# Patient Record
Sex: FEMALE | Race: WHITE | Employment: OTHER | URBAN - METROPOLITAN AREA
[De-identification: names, ages, dates, MRNs, and addresses within clinical notes are randomized per-mention and may not be internally consistent; named-entity substitution may affect disease eponyms.]

---

## 2017-01-03 ENCOUNTER — OFFICE VISIT (OUTPATIENT)
Dept: FAMILY MEDICINE CLINIC | Facility: CLINIC | Age: 82
End: 2017-01-03

## 2017-01-03 ENCOUNTER — HOSPITAL ENCOUNTER (OUTPATIENT)
Dept: GENERAL RADIOLOGY | Facility: HOSPITAL | Age: 82
Discharge: HOME | End: 2017-01-03
Admitting: FAMILY MEDICINE

## 2017-01-03 VITALS
SYSTOLIC BLOOD PRESSURE: 130 MMHG | TEMPERATURE: 98.2 F | DIASTOLIC BLOOD PRESSURE: 60 MMHG | HEART RATE: 84 BPM | RESPIRATION RATE: 18 BRPM

## 2017-01-03 DIAGNOSIS — M54.50 ACUTE MIDLINE LOW BACK PAIN WITHOUT SCIATICA: Primary | ICD-10-CM

## 2017-01-03 DIAGNOSIS — M54.50 ACUTE MIDLINE LOW BACK PAIN WITHOUT SCIATICA: ICD-10-CM

## 2017-01-03 PROCEDURE — 99213 OFFICE O/P EST LOW 20 MIN: CPT | Performed by: FAMILY MEDICINE

## 2017-01-03 PROCEDURE — 72110 X-RAY EXAM L-2 SPINE 4/>VWS: CPT

## 2017-01-03 RX ORDER — TIZANIDINE 4 MG/1
TABLET ORAL
Qty: 30 TABLET | Refills: 0 | Status: SHIPPED | OUTPATIENT
Start: 2017-01-03 | End: 2017-02-08

## 2017-01-03 RX ORDER — HYDROCODONE BITARTRATE AND ACETAMINOPHEN 5; 325 MG/1; MG/1
1 TABLET ORAL EVERY 6 HOURS PRN
Qty: 20 TABLET | Refills: 0 | Status: SHIPPED | OUTPATIENT
Start: 2017-01-03 | End: 2017-02-08

## 2017-01-03 NOTE — PROGRESS NOTES
Subjective   Anabel Fay is a 85 y.o. female.     History of Present Illness     Pt complains of low back pain, both sides  In the morning her leg hurts and can be numb, mostly the left leg  This is the same pain as when she had Deb  She denies any fall nor trauma prior to her back pain starting  This week she did fall but she does not think, nor does her daughter think, that the pain was worse  Daughter notes a pop at times  Pain medication takes about 30 minutes.  The tramadol not helping more and she does need something stronger at night  She has been doing the back PT at home but just not helping very much    Pt unable to leave the home, she does not drive      Review of Systems   Constitutional: Negative.    Musculoskeletal: Positive for back pain.       Objective   Physical Exam   Constitutional: She appears well-developed and well-nourished. No distress.   Cardiovascular: Normal rate, regular rhythm and normal heart sounds.    Pulmonary/Chest: Effort normal and breath sounds normal.   Musculoskeletal:   Gait with walker, shuffling but steady.  No spinal TTP, no SI TTP   Psychiatric: She has a normal mood and affect. Her behavior is normal.   Nursing note and vitals reviewed.      Assessment/Plan   Anabel was seen today for follow-up.    Diagnoses and all orders for this visit:    Acute midline low back pain without sciatica  -     XR Spine Lumbar 4+ View; Future  -     HYDROcodone-acetaminophen (NORCO) 5-325 MG per tablet; Take 1 tablet by mouth Every 6 (Six) Hours As Needed for moderate pain (4-6) or severe pain (7-10).  -     tiZANidine (ZANAFLEX) 4 MG tablet; 1/2-1 po qhs PRN    check XR, muscel relaxer at night, pain medicine for sleep.  F/u pending XR and consider home health if XR normal.  Pt does not drive and cannot leave the house on her own.

## 2017-01-05 ENCOUNTER — TELEPHONE (OUTPATIENT)
Dept: FAMILY MEDICINE CLINIC | Facility: CLINIC | Age: 82
End: 2017-01-05

## 2017-01-05 DIAGNOSIS — S32.030G COMPRESSION FRACTURE OF L3 LUMBAR VERTEBRA, WITH DELAYED HEALING, SUBSEQUENT ENCOUNTER: Primary | ICD-10-CM

## 2017-01-05 DIAGNOSIS — S32.040G COMPRESSION FRACTURE OF L4 LUMBAR VERTEBRA, WITH DELAYED HEALING, SUBSEQUENT ENCOUNTER: ICD-10-CM

## 2017-01-05 NOTE — TELEPHONE ENCOUNTER
Please call.  X-ray shows compression fracture of the third and fourth lumbar vertebra.  These might be new but difficult to say on the x-ray.  Did she recently fall?  Is she having a lot of pain right now?.  Recommend referral to orthopedics for evaluation.  Diagnosis compression fracture lumbar spine, L3-L4. bds

## 2017-01-05 NOTE — TELEPHONE ENCOUNTER
Daughter Destinee calling regarding her mom's xray. Dr. Cooney is out today, it is in the chart, will see if Dr. Bains can look at it.

## 2017-01-05 NOTE — TELEPHONE ENCOUNTER
Called pt's daughter Destinee with the results and she has been hurting since Deb, she fell several days ago, but her mother doesn't always tell her when things happen. Right now, she is still hurting but not hardly as bad.  I informed her you will place the order for the referral and sign it and Tori will call her with an appt.  They prefer mornings if possible.

## 2017-01-06 RX ORDER — CALCITONIN SALMON 200 [IU]/.09ML
SPRAY, METERED NASAL
Qty: 3.7 ML | Refills: 5 | Status: SHIPPED | OUTPATIENT
Start: 2017-01-06 | End: 2017-07-05 | Stop reason: SDUPTHER

## 2017-01-06 NOTE — TELEPHONE ENCOUNTER
Lets start miacalcin (script sent in), could help with pain, agree with ortho eval and would like to get DEXA bone scan but computer will not let me order.

## 2017-01-23 RX ORDER — RALOXIFENE HYDROCHLORIDE 60 MG/1
TABLET, FILM COATED ORAL
Qty: 30 TABLET | Refills: 2 | Status: SHIPPED | OUTPATIENT
Start: 2017-01-23 | End: 2017-04-20 | Stop reason: SDUPTHER

## 2017-02-08 ENCOUNTER — OFFICE VISIT (OUTPATIENT)
Dept: CARDIOLOGY | Facility: CLINIC | Age: 82
End: 2017-02-08

## 2017-02-08 VITALS
WEIGHT: 112 LBS | HEART RATE: 80 BPM | DIASTOLIC BLOOD PRESSURE: 80 MMHG | HEIGHT: 60 IN | BODY MASS INDEX: 21.99 KG/M2 | SYSTOLIC BLOOD PRESSURE: 140 MMHG

## 2017-02-08 DIAGNOSIS — I31.39 PERICARDIAL EFFUSION: Primary | ICD-10-CM

## 2017-02-08 PROCEDURE — 99202 OFFICE O/P NEW SF 15 MIN: CPT | Performed by: INTERNAL MEDICINE

## 2017-02-08 NOTE — PROGRESS NOTES
Subjective:     Encounter Date:02/08/2017      Patient ID: Anabel Fay is a 85 y.o. female.    Chief Complaint:Pericardial effusion        Past Medical History:  1. Pericardial Effusion  A. 12/2016 CT scan with moderate to large pericardial effusion  B. 1/2017 ECHO ([facility]) EF 65-70% moderat LVH, small pericardial effusion. Fibrinous material noted in the pericardium.  2. Dyslipidemia  3. Mild dementia  4. Hypothyroidism  5. Cervical cancer  A. Hysterectomy      Past Surgical History:  1. Hysterectomy  2. Surgery for vaginal prolapse  3. Kyphoplasty 1/2016        Past Medical History   Diagnosis Date   • Hyperlipidemia    • Hypothyroidism          Allergies   Allergen Reactions   • Sulfa Antibiotics          Current Outpatient Prescriptions:   •  atorvastatin (LIPITOR) 20 MG tablet, TAKE ONE TABLET BY MOUTH DAILY, Disp: 30 tablet, Rfl: 4  •  calcitonin, salmon, (MIACALCIN) 200 UNIT/ACT nasal spray, 1 spray in one nostril daily, alternate nostril, Disp: 3.7 mL, Rfl: 5  •  ibuprofen (ADVIL,MOTRIN) 100 MG/5ML suspension, Take 200 mg by mouth Daily., Disp: , Rfl:   •  levothyroxine (SYNTHROID, LEVOTHROID) 75 MCG tablet, TAKE ONE TABLET BY MOUTH DAILY, Disp: 30 tablet, Rfl: 2  •  NAMENDA XR 28 MG capsule sustained-release 24 hr extended release capsule, , Disp: , Rfl:   •  raloxifene (EVISTA) 60 MG tablet, TAKE ONE TABLET BY MOUTH DAILY AS DIRECTED, Disp: 30 tablet, Rfl: 2        History of Present Illness    Patient is a very pleasant 85-year-old  female who were seeing today for further evaluation of pericardial effusion.  She is accompanied by her daughter who gives most history secondary to her dementia.  In December of this past year they've presented to the emergency department for further evaluation of lower back pain.  CT scan was performed at that time which revealed pericardial effusion.  She was eventually noted to have a spinal issue for which she has now undergone kyphoplasty.  She subsequently  "followed up with her primary care physician who referred her for an echocardiogram.  Echocardiogram only showed small pericardial effusion.  Overall patient is asymptomatic.  She denies any chest pain, pressure, tightness.  Denies any increasing shortness of breath.  Denies any syncope, near-syncope, edema.  Patient's daughter notes that they're mostly here for further evaluation of her pericardial effusion.    The following portions of the patient's history were reviewed and updated as appropriate: allergies, current medications, past family history, past medical history, past social history, past surgical history and problem list.    Family History   Problem Relation Age of Onset   • Arthritis Other    • Cancer Other    • Other Other      high blood pressure   • Osteoporosis Other        Social History   Substance Use Topics   • Smoking status: Former Smoker   • Smokeless tobacco: None      Comment: stopped smoking many years ago   • Alcohol use No         Review of Systems   Constitution: Negative for fever, weakness and malaise/fatigue.   HENT: Negative for headaches and nosebleeds.    Eyes: Negative for redness and visual disturbance.   Cardiovascular: Negative for orthopnea, palpitations and paroxysmal nocturnal dyspnea.   Respiratory: Negative for cough, snoring, sputum production and wheezing.    Hematologic/Lymphatic: Negative for bleeding problem.   Skin: Negative for flushing, itching and rash.   Musculoskeletal: Positive for arthritis. Negative for falls, joint pain and muscle cramps.   Gastrointestinal: Negative for abdominal pain, diarrhea, heartburn, nausea and vomiting.   Genitourinary: Negative for hematuria.   Neurological: Negative for excessive daytime sleepiness, dizziness and tremors.   Psychiatric/Behavioral: Positive for memory loss. Negative for substance abuse. The patient is not nervous/anxious.           Objective:    height is 60\" (152.4 cm) and weight is 112 lb (50.8 kg). Her blood " pressure is 140/80 and her pulse is 80.         Physical Exam   Constitutional: She appears well-developed and well-nourished.   HENT:   Head: Normocephalic and atraumatic.   Mouth/Throat: Oropharynx is clear and moist.   Eyes: Conjunctivae are normal. Pupils are equal, round, and reactive to light.   Neck: Normal carotid pulses and no JVD present. Carotid bruit is not present. No thyromegaly present.   Cardiovascular: Normal rate, regular rhythm, S1 normal and S2 normal.  Exam reveals no gallop and no friction rub.    No murmur heard.  Pulses:       Carotid pulses are 2+ on the right side, and 2+ on the left side.       Dorsalis pedis pulses are 2+ on the right side, and 2+ on the left side.        Posterior tibial pulses are 2+ on the right side, and 2+ on the left side.   Pulmonary/Chest: No respiratory distress. She has no wheezes. She has no rales. She exhibits no tenderness.   Abdominal: She exhibits no distension, no abdominal bruit and no mass. There is no hepatosplenomegaly. There is no tenderness. There is no rebound.   Musculoskeletal: She exhibits no edema, tenderness or deformity.   Lymphadenopathy:     She has no cervical adenopathy.   Neurological: She is alert. She has normal strength.   Skin: Skin is warm and dry. No rash noted. No cyanosis. Nails show no clubbing.   Psychiatric: She has a normal mood and affect. Cognition and memory are normal.       Procedures          Assessment:   Assessment/Plan      Anabel was seen today for establish care and hyperlipidemia.    Diagnoses and all orders for this visit:    Pericardial effusion      Discussed options with patient and her caregiver daughter today.  She has a chronic A. fib and is appearing mild to moderate size pericardial effusion without any hemodynamic consequences.  She is asymptomatic.  The etiology is likely post viral/inflammatory.  She had a CT of chest and back recently which showed no evidence of malignancy.  Given her dementia and age, I  do not think any further workup is necessary.  Treatment is not necessary as she is asymptomatic.  This should resolve on its own.  The caregiver was warned that if she develops lower extremity edema presyncope or shortness of breath that she may recontact us for a repeat echocardiogram.  Otherwise we'll treat conservatively.     I, Pravin Whipple MD, personally performed the services described in this documentation as scribed by the above individual in my presence, and it is both accurate and complete    Please note that portions of this note may have been completed with a voice recognition program. Efforts were made to edit the dictations, but occasionally words are mistranscribed.

## 2017-02-09 RX ORDER — ATORVASTATIN CALCIUM 20 MG/1
TABLET, FILM COATED ORAL
Qty: 30 TABLET | Refills: 3 | Status: SHIPPED | OUTPATIENT
Start: 2017-02-09 | End: 2017-06-07 | Stop reason: SDUPTHER

## 2017-03-17 RX ORDER — LEVOTHYROXINE SODIUM 0.07 MG/1
TABLET ORAL
Qty: 30 TABLET | Refills: 1 | Status: SHIPPED | OUTPATIENT
Start: 2017-03-17 | End: 2017-05-10 | Stop reason: SDUPTHER

## 2017-04-20 RX ORDER — RALOXIFENE HYDROCHLORIDE 60 MG/1
TABLET, FILM COATED ORAL
Qty: 30 TABLET | Refills: 1 | Status: SHIPPED | OUTPATIENT
Start: 2017-04-20 | End: 2017-06-17 | Stop reason: SDUPTHER

## 2017-05-05 ENCOUNTER — OFFICE VISIT (OUTPATIENT)
Dept: FAMILY MEDICINE CLINIC | Facility: CLINIC | Age: 82
End: 2017-05-05

## 2017-05-05 VITALS
DIASTOLIC BLOOD PRESSURE: 90 MMHG | OXYGEN SATURATION: 98 % | TEMPERATURE: 97.6 F | HEART RATE: 71 BPM | BODY MASS INDEX: 22.78 KG/M2 | RESPIRATION RATE: 14 BRPM | WEIGHT: 116 LBS | SYSTOLIC BLOOD PRESSURE: 136 MMHG | HEIGHT: 60 IN

## 2017-05-05 DIAGNOSIS — R35.0 URINE FREQUENCY: ICD-10-CM

## 2017-05-05 DIAGNOSIS — F02.80 ALZHEIMER'S DISEASE OF OTHER ONSET WITHOUT BEHAVIORAL DISTURBANCE: ICD-10-CM

## 2017-05-05 DIAGNOSIS — E78.00 PURE HYPERCHOLESTEROLEMIA: Primary | ICD-10-CM

## 2017-05-05 DIAGNOSIS — L98.9 SKIN LESION OF LEFT LEG: ICD-10-CM

## 2017-05-05 DIAGNOSIS — E03.9 ACQUIRED HYPOTHYROIDISM: ICD-10-CM

## 2017-05-05 DIAGNOSIS — G30.8 ALZHEIMER'S DISEASE OF OTHER ONSET WITHOUT BEHAVIORAL DISTURBANCE: ICD-10-CM

## 2017-05-05 LAB
ALBUMIN SERPL-MCNC: 3.9 G/DL (ref 3.2–4.8)
ALBUMIN/GLOB SERPL: 1.5 G/DL (ref 1.5–2.5)
ALP SERPL-CCNC: 78 U/L (ref 25–100)
ALT SERPL-CCNC: 16 U/L (ref 7–40)
AST SERPL-CCNC: 26 U/L (ref 0–33)
BILIRUB BLD-MCNC: NEGATIVE MG/DL
BILIRUB SERPL-MCNC: 0.6 MG/DL (ref 0.3–1.2)
BUN SERPL-MCNC: 19 MG/DL (ref 9–23)
BUN/CREAT SERPL: 23.8 (ref 7–25)
CALCIUM SERPL-MCNC: 9.4 MG/DL (ref 8.7–10.4)
CHLORIDE SERPL-SCNC: 107 MMOL/L (ref 99–109)
CHOLEST SERPL-MCNC: 185 MG/DL (ref 0–200)
CHOLEST/HDLC SERPL: 2.5 {RATIO}
CLARITY, POC: CLEAR
CO2 SERPL-SCNC: 29 MMOL/L (ref 20–31)
COLOR UR: YELLOW
CREAT SERPL-MCNC: 0.8 MG/DL (ref 0.6–1.3)
GLOBULIN SER CALC-MCNC: 2.6 GM/DL
GLUCOSE SERPL-MCNC: 88 MG/DL (ref 70–100)
GLUCOSE UR STRIP-MCNC: NEGATIVE MG/DL
HDLC SERPL-MCNC: 74 MG/DL (ref 40–60)
KETONES UR QL: NEGATIVE
LDLC SERPL CALC-MCNC: 94 MG/DL (ref 0–100)
LEUKOCYTE EST, POC: NEGATIVE
NITRITE UR-MCNC: NEGATIVE MG/ML
PH UR: 6 [PH] (ref 5–8)
POTASSIUM SERPL-SCNC: 4.3 MMOL/L (ref 3.5–5.5)
PROT SERPL-MCNC: 6.5 G/DL (ref 5.7–8.2)
PROT UR STRIP-MCNC: NEGATIVE MG/DL
RBC # UR STRIP: ABNORMAL /UL
SODIUM SERPL-SCNC: 140 MMOL/L (ref 132–146)
SP GR UR: 1.02 (ref 1–1.03)
TRIGL SERPL-MCNC: 87 MG/DL (ref 0–150)
TSH SERPL DL<=0.005 MIU/L-ACNC: 1.81 MIU/ML (ref 0.35–5.35)
UROBILINOGEN UR QL: NORMAL
VLDLC SERPL CALC-MCNC: 17.4 MG/DL

## 2017-05-05 PROCEDURE — 99214 OFFICE O/P EST MOD 30 MIN: CPT | Performed by: FAMILY MEDICINE

## 2017-05-05 PROCEDURE — 81003 URINALYSIS AUTO W/O SCOPE: CPT | Performed by: FAMILY MEDICINE

## 2017-05-08 ENCOUNTER — TELEPHONE (OUTPATIENT)
Dept: FAMILY MEDICINE CLINIC | Facility: CLINIC | Age: 82
End: 2017-05-08

## 2017-05-08 DIAGNOSIS — N30.01 ACUTE CYSTITIS WITH HEMATURIA: Primary | ICD-10-CM

## 2017-05-08 RX ORDER — NITROFURANTOIN 25; 75 MG/1; MG/1
100 CAPSULE ORAL 2 TIMES DAILY
Qty: 14 CAPSULE | Refills: 0 | Status: SHIPPED | OUTPATIENT
Start: 2017-05-08 | End: 2017-11-16

## 2017-05-10 RX ORDER — LEVOTHYROXINE SODIUM 0.07 MG/1
TABLET ORAL
Qty: 30 TABLET | Refills: 1 | Status: SHIPPED | OUTPATIENT
Start: 2017-05-10 | End: 2017-07-07 | Stop reason: SDUPTHER

## 2017-06-07 RX ORDER — ATORVASTATIN CALCIUM 20 MG/1
TABLET, FILM COATED ORAL
Qty: 30 TABLET | Refills: 2 | Status: SHIPPED | OUTPATIENT
Start: 2017-06-07 | End: 2017-09-07 | Stop reason: SDUPTHER

## 2017-06-19 RX ORDER — RALOXIFENE HYDROCHLORIDE 60 MG/1
TABLET, FILM COATED ORAL
Qty: 30 TABLET | Refills: 0 | Status: SHIPPED | OUTPATIENT
Start: 2017-06-19 | End: 2017-07-18 | Stop reason: SDUPTHER

## 2017-07-05 DIAGNOSIS — S32.040G COMPRESSION FRACTURE OF L4 LUMBAR VERTEBRA, WITH DELAYED HEALING, SUBSEQUENT ENCOUNTER: ICD-10-CM

## 2017-07-05 DIAGNOSIS — S32.030G COMPRESSION FRACTURE OF L3 LUMBAR VERTEBRA, WITH DELAYED HEALING, SUBSEQUENT ENCOUNTER: ICD-10-CM

## 2017-07-05 RX ORDER — CALCITONIN SALMON 200 [IU]/.09ML
SPRAY, METERED NASAL
Qty: 1 EACH | Refills: 4 | Status: SHIPPED | OUTPATIENT
Start: 2017-07-05 | End: 2017-11-30 | Stop reason: SDUPTHER

## 2017-07-07 RX ORDER — LEVOTHYROXINE SODIUM 0.07 MG/1
TABLET ORAL
Qty: 30 TABLET | Refills: 0 | Status: SHIPPED | OUTPATIENT
Start: 2017-07-07 | End: 2017-07-07 | Stop reason: SDUPTHER

## 2017-07-07 RX ORDER — LEVOTHYROXINE SODIUM 0.07 MG/1
75 TABLET ORAL DAILY
Qty: 30 TABLET | Refills: 2 | Status: SHIPPED | OUTPATIENT
Start: 2017-07-07 | End: 2017-11-05 | Stop reason: SDUPTHER

## 2017-07-18 RX ORDER — RALOXIFENE HYDROCHLORIDE 60 MG/1
TABLET, FILM COATED ORAL
Qty: 30 TABLET | Refills: 0 | Status: SHIPPED | OUTPATIENT
Start: 2017-07-18 | End: 2017-08-16 | Stop reason: SDUPTHER

## 2017-08-16 RX ORDER — RALOXIFENE HYDROCHLORIDE 60 MG/1
TABLET, FILM COATED ORAL
Qty: 30 TABLET | Refills: 0 | Status: SHIPPED | OUTPATIENT
Start: 2017-08-16 | End: 2017-09-13 | Stop reason: SDUPTHER

## 2017-09-07 RX ORDER — ATORVASTATIN CALCIUM 20 MG/1
TABLET, FILM COATED ORAL
Qty: 30 TABLET | Refills: 1 | Status: SHIPPED | OUTPATIENT
Start: 2017-09-07 | End: 2017-11-06 | Stop reason: SDUPTHER

## 2017-09-13 RX ORDER — RALOXIFENE HYDROCHLORIDE 60 MG/1
TABLET, FILM COATED ORAL
Qty: 30 TABLET | Refills: 0 | Status: SHIPPED | OUTPATIENT
Start: 2017-09-13 | End: 2017-10-11 | Stop reason: SDUPTHER

## 2017-10-11 RX ORDER — RALOXIFENE HYDROCHLORIDE 60 MG/1
TABLET, FILM COATED ORAL
Qty: 30 TABLET | Refills: 1 | Status: SHIPPED | OUTPATIENT
Start: 2017-10-11 | End: 2017-12-08 | Stop reason: SDUPTHER

## 2017-11-06 RX ORDER — LEVOTHYROXINE SODIUM 0.07 MG/1
TABLET ORAL
Qty: 30 TABLET | Refills: 0 | Status: SHIPPED | OUTPATIENT
Start: 2017-11-06 | End: 2017-11-27 | Stop reason: SDUPTHER

## 2017-11-06 RX ORDER — ATORVASTATIN CALCIUM 20 MG/1
TABLET, FILM COATED ORAL
Qty: 30 TABLET | Refills: 0 | Status: SHIPPED | OUTPATIENT
Start: 2017-11-06 | End: 2017-12-06 | Stop reason: SDUPTHER

## 2017-11-16 ENCOUNTER — OFFICE VISIT (OUTPATIENT)
Dept: FAMILY MEDICINE CLINIC | Facility: CLINIC | Age: 82
End: 2017-11-16

## 2017-11-16 VITALS
HEART RATE: 66 BPM | WEIGHT: 114 LBS | TEMPERATURE: 97.6 F | RESPIRATION RATE: 12 BRPM | HEIGHT: 60 IN | OXYGEN SATURATION: 95 % | DIASTOLIC BLOOD PRESSURE: 78 MMHG | BODY MASS INDEX: 22.38 KG/M2 | SYSTOLIC BLOOD PRESSURE: 156 MMHG

## 2017-11-16 DIAGNOSIS — E78.00 PURE HYPERCHOLESTEROLEMIA: Primary | ICD-10-CM

## 2017-11-16 DIAGNOSIS — G30.8 ALZHEIMER'S DISEASE OF OTHER ONSET WITHOUT BEHAVIORAL DISTURBANCE: ICD-10-CM

## 2017-11-16 DIAGNOSIS — F02.80 ALZHEIMER'S DISEASE OF OTHER ONSET WITHOUT BEHAVIORAL DISTURBANCE: ICD-10-CM

## 2017-11-16 DIAGNOSIS — Z23 IMMUNIZATION DUE: ICD-10-CM

## 2017-11-16 PROCEDURE — 90662 IIV NO PRSV INCREASED AG IM: CPT | Performed by: FAMILY MEDICINE

## 2017-11-16 PROCEDURE — 99213 OFFICE O/P EST LOW 20 MIN: CPT | Performed by: FAMILY MEDICINE

## 2017-11-16 PROCEDURE — G0008 ADMIN INFLUENZA VIRUS VAC: HCPCS | Performed by: FAMILY MEDICINE

## 2017-11-16 RX ORDER — MEMANTINE HYDROCHLORIDE AND DONEPEZIL HYDROCHLORIDE 28; 10 MG/1; MG/1
CAPSULE ORAL
Qty: 30 CAPSULE | COMMUNITY
Start: 2017-11-16 | End: 2018-05-24

## 2017-11-16 RX ORDER — MEMANTINE HYDROCHLORIDE AND DONEPEZIL HYDROCHLORIDE 28; 10 MG/1; MG/1
CAPSULE ORAL
COMMUNITY
Start: 2017-10-28 | End: 2017-11-16 | Stop reason: SDUPTHER

## 2017-11-16 NOTE — PROGRESS NOTES
Assessment/Plan     Problem List Items Addressed This Visit        Cardiovascular and Mediastinum    Hyperlipidemia - Primary       Nervous and Auditory    Dementia without behavioral disturbance    Relevant Medications    NAMZARIC 28-10 MG capsule sustained-release 24 hr      Other Visit Diagnoses     Immunization due        Relevant Orders    Flu Vaccine High Dose PF 65YR+ ([id number]) (Completed)           Follow up: Return in about 6 months (around 5/16/2018), or if symptoms worsen or fail to improve.     DISCUSSION  Overall stable and doing well. Continue current medications. Chronic problems noted are stable. Check labs in 6 months since last check was good and Return in about 6 months (around 5/16/2018), or if symptoms worsen or fail to improve.       Flu shot today as well.     MEDICATIONS PRESCRIBED  Requested Prescriptions      No prescriptions requested or ordered in this encounter            -------------------------------------------    Subjective     Chief Complaint   Patient presents with   • Hyperlipidemia   • Hypothyroidism   • Med Refill       Hyperlipidemia   This is a chronic problem. The current episode started more than 1 year ago. The problem is controlled. Exacerbating diseases include hypothyroidism. Pertinent negatives include no myalgias. The current treatment provides moderate improvement of lipids. There are no compliance problems.  Risk factors for coronary artery disease include dyslipidemia.   Hypothyroidism   This is a chronic problem. The current episode started more than 1 year ago. The problem occurs constantly. The problem has been unchanged (Doing well on medication.). Pertinent negatives include no myalgias, nausea or vomiting. Nothing aggravates the symptoms. Treatments tried: Levothyroxin. The treatment provided moderate relief.     Memory loss  Sees Dr Bains, neurology 11/27/2017.  Still with memory loss.   On Namzaric      Past Medical History,Medications, Allergies, and  "social history was reviewed.    Review of Systems   Constitutional: Negative.    HENT: Negative.    Respiratory: Negative.    Cardiovascular: Negative.    Gastrointestinal: Negative for nausea and vomiting.   Musculoskeletal: Negative for myalgias.   Neurological: Negative.    Psychiatric/Behavioral:        Memory loss, sl worse per dtr, sees Dr Bains       Objective     Vitals:    11/16/17 1052   BP: 156/78   Pulse: 66   Resp: 12   Temp: 97.6 °F (36.4 °C)   TempSrc: Temporal Artery    SpO2: 95%   Weight: 114 lb (51.7 kg)   Height: 60\" (152.4 cm)        Physical Exam   Constitutional: She is oriented to person, place, and time. She appears well-developed and well-nourished.   HENT:   Head: Normocephalic and atraumatic.   Right Ear: Hearing and external ear normal.   Left Ear: Hearing and external ear normal.   Eyes: Conjunctivae and EOM are normal. Pupils are equal, round, and reactive to light.   Cardiovascular: Normal rate and regular rhythm.  Exam reveals no friction rub.    Murmur (1-2 /6 sys, no change) heard.  Pulmonary/Chest: Effort normal and breath sounds normal. No respiratory distress. She has no wheezes. She has no rales.   Abdominal: Soft. She exhibits no distension. There is no tenderness.   Musculoskeletal: She exhibits no edema.   Neurological: She is alert and oriented to person, place, and time.   Skin: Skin is warm.   Psychiatric: She has a normal mood and affect. Her behavior is normal.   Nursing note and vitals reviewed.              Aldo Bains MD    "

## 2017-11-27 RX ORDER — LEVOTHYROXINE SODIUM 0.07 MG/1
TABLET ORAL
Qty: 30 TABLET | Refills: 5 | Status: SHIPPED | OUTPATIENT
Start: 2017-11-27 | End: 2018-05-25

## 2017-11-30 RX ORDER — CALCITONIN SALMON 200 [IU]/.09ML
SPRAY, METERED NASAL
Qty: 1 EACH | Refills: 3 | Status: SHIPPED | OUTPATIENT
Start: 2017-11-30 | End: 2018-05-24

## 2017-12-06 RX ORDER — ATORVASTATIN CALCIUM 20 MG/1
TABLET, FILM COATED ORAL
Qty: 30 TABLET | Refills: 5 | Status: SHIPPED | OUTPATIENT
Start: 2017-12-06 | End: 2018-05-24

## 2017-12-08 RX ORDER — RALOXIFENE HYDROCHLORIDE 60 MG/1
TABLET, FILM COATED ORAL
Qty: 30 TABLET | Refills: 1 | Status: SHIPPED | OUTPATIENT
Start: 2017-12-08 | End: 2018-02-07 | Stop reason: SDUPTHER

## 2018-01-26 ENCOUNTER — TELEPHONE (OUTPATIENT)
Dept: FAMILY MEDICINE CLINIC | Facility: CLINIC | Age: 83
End: 2018-01-26

## 2018-01-26 NOTE — TELEPHONE ENCOUNTER
Week half ago pt took a turn for worse. HH OR HOSPICE NEEDS TO CK HER. FROM WHAT DAUGHTER HAS SPOKEN WITH OTHERS WITH EXPERIENCE IN THIS SITUATION.  SHE ASK DAUGHTER TODAY WHO SHE WAS. SHE ISN'T ABLE TO GET IN SHOWER SHE IS AFRAID SHE WILL FALL AND CRIES AND SLEEPS ALL THE TIME. DAUGHTER IS ASKING IF WE CAN GET EVAL AND ASKING FOR A SHOWER CHAIR. PT DOESN'T DO CONVERSATION ANYMORE SHE EITHER ANSWES YES OR NO OR SHE REMEMBERS THAT. NATALIYA WANTED YOUR OPINION ON WHAT TO DO.

## 2018-01-26 NOTE — TELEPHONE ENCOUNTER
Please call, if made a drastic change, then rec office visit to check and make sure no infection or urinary tract infection. Can she get her in ? May need to see Ankita if can get in today.

## 2018-01-26 NOTE — TELEPHONE ENCOUNTER
SPOKE WITH DAUGHTER AND INFORMED HER OF THIS AND DAUGHTER STATES 10 DAYS AGO THEY TESTED URINE AND IT WAS OK. SO THEY STARTED HER ON ATIVAN 0.5 AND IT HELPS BUT SHE ISN'T EASY TO GET ALONG WITH RIGHT NOW. PT HAS KIND OF QUIT EATING AND SHE HAS RECOGNIZED DAUGHTER 2. DAUGHTER HAS NO ONE TO SIT WITH MICHELE TODAY.  DAUGHTER STATES THAT SHE WILL TRY TO GET HER HERE AS PT IS BEING VERY DIFFICULT LATELY BUT WILL DO HER BEST AND WAS TRANSFERRED UP FRONT TO SCHEDULE APPT.

## 2018-01-26 NOTE — TELEPHONE ENCOUNTER
----- Message from Kamilla Vo sent at 1/26/2018 10:24 AM EST -----  Contact: BROWNE  DAUGHTER, JESÚS MENCHACA, NEEDS TO SPEAK WITH YOU ABOUT PT. SHE HAS TAKEN A TURN FOR THE WORSE WITH HER DERM.     [phone]

## 2018-02-05 ENCOUNTER — TELEPHONE (OUTPATIENT)
Dept: FAMILY MEDICINE CLINIC | Facility: CLINIC | Age: 83
End: 2018-02-05

## 2018-02-05 NOTE — TELEPHONE ENCOUNTER
Please call, more info. How is she doing? She was supposed to come in for a check. Is she able to come in?

## 2018-02-05 NOTE — TELEPHONE ENCOUNTER
----- Message from Kamilla Meredith sent at 2/5/2018  1:36 PM EST -----  Contact: tai;BRITTANY [facility] DR BROWNE BE THE ATTENDING PHYSICIAN FOR PT AND CERTIFY PT  AS TERMINAL    [phone]

## 2018-02-05 NOTE — TELEPHONE ENCOUNTER
SPOKE WITH NATALIYA AND PT ISN'T ABLE TO COME IN AND HOSPICE IS COMING IN THE MORNING TO EVALUATE HER AND PT ISN'T DOING WELL. NATALIYA SAYS PT HAS REALLY GONE DOWN AND SHE IS HARDLY ABLE TO GET HER MEDS IN HER. PLEASE ADVISE ON IF YOU WANT ME CALL BRITTANY FROM HOSPICE TOMORROW.

## 2018-02-07 RX ORDER — RALOXIFENE HYDROCHLORIDE 60 MG/1
TABLET, FILM COATED ORAL
Qty: 30 TABLET | Refills: 0 | Status: SHIPPED | OUTPATIENT
Start: 2018-02-07 | End: 2018-05-24

## 2018-04-19 ENCOUNTER — TELEPHONE (OUTPATIENT)
Dept: FAMILY MEDICINE CLINIC | Facility: CLINIC | Age: 83
End: 2018-04-19

## 2018-04-19 NOTE — TELEPHONE ENCOUNTER
----- Message from Stacey Carroll sent at 4/19/2018 10:01 AM EDT -----  Contact: Herson Parnell with Hospice care is calling to let Dr. Bains know that patient is being discharged on May 7th. Please call at [phone].

## 2018-05-24 ENCOUNTER — OFFICE VISIT (OUTPATIENT)
Dept: FAMILY MEDICINE CLINIC | Facility: CLINIC | Age: 83
End: 2018-05-24

## 2018-05-24 VITALS
HEART RATE: 74 BPM | SYSTOLIC BLOOD PRESSURE: 152 MMHG | DIASTOLIC BLOOD PRESSURE: 84 MMHG | RESPIRATION RATE: 12 BRPM | OXYGEN SATURATION: 95 % | BODY MASS INDEX: 22.85 KG/M2 | WEIGHT: 117 LBS

## 2018-05-24 DIAGNOSIS — G30.1 LATE ONSET ALZHEIMER'S DISEASE WITHOUT BEHAVIORAL DISTURBANCE (HCC): Primary | ICD-10-CM

## 2018-05-24 DIAGNOSIS — E03.9 ACQUIRED HYPOTHYROIDISM: ICD-10-CM

## 2018-05-24 DIAGNOSIS — F02.80 LATE ONSET ALZHEIMER'S DISEASE WITHOUT BEHAVIORAL DISTURBANCE (HCC): Primary | ICD-10-CM

## 2018-05-24 DIAGNOSIS — F41.9 ANXIETY: ICD-10-CM

## 2018-05-24 PROCEDURE — 99214 OFFICE O/P EST MOD 30 MIN: CPT | Performed by: FAMILY MEDICINE

## 2018-05-24 RX ORDER — LORAZEPAM 0.5 MG/1
0.5 TABLET ORAL EVERY 8 HOURS PRN
Qty: 60 TABLET | Refills: 2 | Status: SHIPPED | OUTPATIENT
Start: 2018-05-24 | End: 2018-07-16 | Stop reason: SDUPTHER

## 2018-05-24 RX ORDER — LORAZEPAM 0.5 MG/1
0.5 TABLET ORAL EVERY 8 HOURS PRN
COMMUNITY
End: 2018-05-24 | Stop reason: SDUPTHER

## 2018-05-24 NOTE — PROGRESS NOTES
Assessment/Plan       Problems Addressed this Visit        Endocrine    Hypothyroidism    Relevant Orders    TSH       Nervous and Auditory    Dementia without behavioral disturbance - Primary    Relevant Medications    LORazepam (ATIVAN) 0.5 MG tablet    Other Relevant Orders    CBC & Differential    Comprehensive Metabolic Panel       Other    Anxiety    Relevant Medications    LORazepam (ATIVAN) 0.5 MG tablet            Follow up: Return for follow up depends on review of labs and testing.     DISCUSSION  Alzheimer's dementia.  Stable.  Continue lorazepam as needed for anxiety.  Check blood work as noted.  Per neurology, okay to be off of the medications.    Hypothyroidism.  Check TSH.    Anxiety.  Stable on lorazepam.  Daughter gives this to her.  It is helpful in controlling her moods without side effects.      MEDICATIONS PRESCRIBED  Requested Prescriptions     Signed Prescriptions Disp Refills   • LORazepam (ATIVAN) 0.5 MG tablet 60 tablet 2     Sig: Take 1 tablet by mouth Every 8 (Eight) Hours As Needed for Anxiety.            Isiah dated on 5/24/2018   was reviewed and appropriate.        -------------------------------------------    Subjective     Chief Complaint   Patient presents with   • follow up to hospice   • Labs Only     lipid due   • Med Refill         Hypothyroidism   This is a chronic problem. The current episode started more than 1 year ago. The problem occurs daily. The problem has been unchanged. Associated symptoms include fatigue (at times). Pertinent negatives include no anorexia, change in bowel habit, congestion, coughing, nausea or vomiting. Nothing aggravates the symptoms. Treatments tried: levothyroxine. The treatment provided moderate relief.       Dementia  Has been feeling ok  Sleeping ok, perks up in the afternoon  On ativan and helps to calm her  Eating well, good appetite  Memory worse this week,   Good days and bad days  Had been on Namzeric. Hospice MD and Ese Bains  okayed her to d/c the [facility] released her 2 weeks ago  Was doing well    Anxiety  Gets quite anxious at times.  Lorazepam was started via hospice and neurology.  It is working well.  No reported side effects or increased sedation.        History   Smoking Status   • Former Smoker   Smokeless Tobacco   • Never Used     Comment: stopped smoking many years ago        Past Medical History,Medications, Allergies, and social history was reviewed.      Review of Systems   Constitutional: Positive for fatigue (at times).   HENT: Negative.  Negative for congestion.    Respiratory: Negative.  Negative for cough.    Cardiovascular: Negative.    Gastrointestinal: Negative.  Negative for anorexia, change in bowel habit, nausea and vomiting.   Musculoskeletal: Negative.    Neurological: Positive for confusion.       Objective     Vitals:    05/24/18 1205   BP: 152/84   Pulse: 74   Resp: 12   SpO2: 95%   Weight: 53.1 kg (117 lb)          Physical Exam   Constitutional: She appears well-developed and well-nourished.   HENT:   Head: Normocephalic and atraumatic.   Right Ear: Hearing and external ear normal.   Left Ear: Hearing and external ear normal.   Mouth/Throat: Oropharynx is clear and moist.   Eyes: Conjunctivae and EOM are normal. Pupils are equal, round, and reactive to light.   Neck: Normal range of motion. No thyromegaly present.   Cardiovascular: Normal rate, regular rhythm and normal heart sounds.  Exam reveals no friction rub.    No murmur heard.  Pulmonary/Chest: Effort normal and breath sounds normal. No respiratory distress. She has no wheezes. She has no rales.   Abdominal: Soft. Bowel sounds are normal. She exhibits no distension. There is no tenderness. There is no guarding.   Neurological: She is alert.   Skin: Skin is warm.   Psychiatric: She has a normal mood and affect. Her behavior is normal.   Nursing note and vitals reviewed.                Aldo Bains MD

## 2018-05-25 LAB
ALBUMIN SERPL-MCNC: 4 G/DL (ref 3.2–4.8)
ALBUMIN/GLOB SERPL: 1.7 G/DL (ref 1.5–2.5)
ALP SERPL-CCNC: 75 U/L (ref 25–100)
ALT SERPL-CCNC: 13 U/L (ref 7–40)
AST SERPL-CCNC: 19 U/L (ref 0–33)
BASOPHILS # BLD AUTO: 0.06 10*3/MM3 (ref 0–0.2)
BASOPHILS NFR BLD AUTO: 1.6 % (ref 0–1)
BILIRUB SERPL-MCNC: 0.6 MG/DL (ref 0.3–1.2)
BUN SERPL-MCNC: 17 MG/DL (ref 9–23)
BUN/CREAT SERPL: 21.3 (ref 7–25)
CALCIUM SERPL-MCNC: 8.7 MG/DL (ref 8.7–10.4)
CHLORIDE SERPL-SCNC: 106 MMOL/L (ref 99–109)
CO2 SERPL-SCNC: 27 MMOL/L (ref 20–31)
CREAT SERPL-MCNC: 0.8 MG/DL (ref 0.6–1.3)
EOSINOPHIL # BLD AUTO: 0.21 10*3/MM3 (ref 0–0.3)
EOSINOPHIL NFR BLD AUTO: 5.4 % (ref 0–3)
ERYTHROCYTE [DISTWIDTH] IN BLOOD BY AUTOMATED COUNT: 15.7 % (ref 11.3–14.5)
GFR SERPLBLD CREATININE-BSD FMLA CKD-EPI: 68 ML/MIN/1.73
GFR SERPLBLD CREATININE-BSD FMLA CKD-EPI: 82 ML/MIN/1.73
GLOBULIN SER CALC-MCNC: 2.4 GM/DL
GLUCOSE SERPL-MCNC: 94 MG/DL (ref 70–100)
HCT VFR BLD AUTO: 37.7 % (ref 34.5–44)
HGB BLD-MCNC: 11.8 G/DL (ref 11.5–15.5)
IMM GRANULOCYTES # BLD: 0 10*3/MM3 (ref 0–0.03)
IMM GRANULOCYTES NFR BLD: 0 % (ref 0–0.6)
LYMPHOCYTES # BLD AUTO: 1.69 10*3/MM3 (ref 0.6–4.8)
LYMPHOCYTES NFR BLD AUTO: 43.8 % (ref 24–44)
MCH RBC QN AUTO: 30.6 PG (ref 27–31)
MCHC RBC AUTO-ENTMCNC: 31.3 G/DL (ref 32–36)
MCV RBC AUTO: 97.9 FL (ref 80–99)
MONOCYTES # BLD AUTO: 0.41 10*3/MM3 (ref 0–1)
MONOCYTES NFR BLD AUTO: 10.6 % (ref 0–12)
NEUTROPHILS # BLD AUTO: 1.49 10*3/MM3 (ref 1.5–8.3)
NEUTROPHILS NFR BLD AUTO: 38.6 % (ref 41–71)
PLATELET # BLD AUTO: 120 10*3/MM3 (ref 150–450)
POTASSIUM SERPL-SCNC: 4.4 MMOL/L (ref 3.5–5.5)
PROT SERPL-MCNC: 6.4 G/DL (ref 5.7–8.2)
RBC # BLD AUTO: 3.85 10*6/MM3 (ref 3.89–5.14)
SODIUM SERPL-SCNC: 141 MMOL/L (ref 132–146)
TSH SERPL DL<=0.005 MIU/L-ACNC: 0.06 MIU/ML (ref 0.35–5.35)
WBC # BLD AUTO: 3.86 10*3/MM3 (ref 3.5–10.8)

## 2018-05-25 RX ORDER — LEVOTHYROXINE SODIUM 0.05 MG/1
50 TABLET ORAL DAILY
Qty: 30 TABLET | Refills: 5 | Status: SHIPPED | OUTPATIENT
Start: 2018-05-25 | End: 2018-09-19 | Stop reason: SDUPTHER

## 2018-06-26 ENCOUNTER — TELEPHONE (OUTPATIENT)
Dept: FAMILY MEDICINE CLINIC | Facility: CLINIC | Age: 83
End: 2018-06-26

## 2018-06-26 NOTE — TELEPHONE ENCOUNTER
----- Message from April CHRISTOPHE Singer sent at 6/26/2018 12:21 PM EDT -----  Contact: DR BROWNE PT   CALL FROM PT DAUGHTER, NATALIYA MENCHACA, IN REGARDS TO LORazepam (ATIVAN) 0.5 MG tablet. STATES THAT PT GETS ANTSY AROUND 5 AND SHE WANTS TO KNOW IF IT IS OK TO GIVE HER 2 AT A TIME DURING THAT TIME   CALL BACK NUMBER FOR NATALIYA [phone].

## 2018-07-09 ENCOUNTER — TELEPHONE (OUTPATIENT)
Dept: FAMILY MEDICINE CLINIC | Facility: CLINIC | Age: 83
End: 2018-07-09

## 2018-07-09 ENCOUNTER — OFFICE VISIT (OUTPATIENT)
Dept: FAMILY MEDICINE CLINIC | Facility: CLINIC | Age: 83
End: 2018-07-09

## 2018-07-09 VITALS
SYSTOLIC BLOOD PRESSURE: 126 MMHG | HEIGHT: 60 IN | TEMPERATURE: 99.8 F | WEIGHT: 111 LBS | RESPIRATION RATE: 20 BRPM | BODY MASS INDEX: 21.79 KG/M2 | DIASTOLIC BLOOD PRESSURE: 70 MMHG | HEART RATE: 80 BPM

## 2018-07-09 DIAGNOSIS — N30.00 ACUTE CYSTITIS WITHOUT HEMATURIA: Primary | ICD-10-CM

## 2018-07-09 DIAGNOSIS — R32 URINARY INCONTINENCE, UNSPECIFIED TYPE: ICD-10-CM

## 2018-07-09 LAB
BILIRUB BLD-MCNC: NEGATIVE MG/DL
CLARITY, POC: CLEAR
COLOR UR: ABNORMAL
GLUCOSE UR STRIP-MCNC: NEGATIVE MG/DL
KETONES UR QL: NEGATIVE
LEUKOCYTE EST, POC: ABNORMAL
NITRITE UR-MCNC: NEGATIVE MG/ML
PH UR: 5 [PH] (ref 5–8)
PROT UR STRIP-MCNC: NEGATIVE MG/DL
RBC # UR STRIP: NEGATIVE /UL
SP GR UR: 1.02 (ref 1–1.03)
UROBILINOGEN UR QL: NORMAL

## 2018-07-09 PROCEDURE — 99213 OFFICE O/P EST LOW 20 MIN: CPT | Performed by: FAMILY MEDICINE

## 2018-07-09 PROCEDURE — 81002 URINALYSIS NONAUTO W/O SCOPE: CPT | Performed by: FAMILY MEDICINE

## 2018-07-09 RX ORDER — CIPROFLOXACIN 250 MG/1
250 TABLET, FILM COATED ORAL EVERY 12 HOURS SCHEDULED
Qty: 14 TABLET | Refills: 0 | Status: SHIPPED | OUTPATIENT
Start: 2018-07-09 | End: 2018-09-19

## 2018-07-09 NOTE — PATIENT INSTRUCTIONS
Go to the nearest ER or return to clinic if symptoms worsen, fever/chill develop      Urinary Tract Infection, Adult  A urinary tract infection (UTI) is an infection of any part of the urinary tract. The urinary tract includes the:  · Kidneys.  · Ureters.  · Bladder.  · Urethra.    These organs make, store, and get rid of pee (urine) in the body.  Follow these instructions at home:  · Take over-the-counter and prescription medicines only as told by your doctor.  · If you were prescribed an antibiotic medicine, take it as told by your doctor. Do not stop taking the antibiotic even if you start to feel better.  · Avoid the following drinks:  ? Alcohol.  ? Caffeine.  ? Tea.  ? Carbonated drinks.  · Drink enough fluid to keep your pee clear or pale yellow.  · Keep all follow-up visits as told by your doctor. This is important.  · Make sure to:  ? Empty your bladder often and completely. Do not to hold pee for long periods of time.  ? Empty your bladder before and after sex.  ? Wipe from front to back after a bowel movement if you are female. Use each tissue one time when you wipe.  Contact a doctor if:  · You have back pain.  · You have a fever.  · You feel sick to your stomach (nauseous).  · You throw up (vomit).  · Your symptoms do not get better after 3 days.  · Your symptoms go away and then come back.  Get help right away if:  · You have very bad back pain.  · You have very bad lower belly (abdominal) pain.  · You are throwing up and cannot keep down any medicines or water.  This information is not intended to replace advice given to you by your health care provider. Make sure you discuss any questions you have with your health care provider.  Document Released: 06/05/2009 Document Revised: 05/25/2017 Document Reviewed: 11/07/2016  [employer] Interactive Patient Education © 2018 [employer]

## 2018-07-09 NOTE — TELEPHONE ENCOUNTER
----- Message from Kamilla Meredith sent at 7/9/2018  8:16 AM EDT -----  Contact: PAVAN;PT CALLED  PT IS BEING DISORIENTED  AND NOT WALKING WELL-HAVING A VERY HARD  TIME-DAUGHTER IS CONCERN IT'S A POSSIBLE UTI-COULD SHE BRING IN  A SPECIMEN TO CHECK? PT HAS ALZHEIMER AND VERY DIFFICULT TO BRING  INTO THE OFFICE. DAUGHTER WILL STOP BY FOR HAT CONTAINER EITHER WAY.       [phone]  PLEASE LEAVE MESSAGE IF NO ANSWER

## 2018-07-09 NOTE — PROGRESS NOTES
Subjective   Anabel Fay is a 87 y.o. female.   Chief Complaint   Patient presents with   • Altered Mental Status   • Urinary Incontinence     Urinary Tract Infection    This is a new problem. The current episode started in the past 7 days. The problem occurs every urination. The problem has been gradually worsening. Maximum temperature: low grade. The fever has been present for 1 - 2 days. She is not sexually active. Associated symptoms include urgency. Pertinent negatives include no chills, discharge or frequency. She has tried nothing for the symptoms. The treatment provided no relief.     She has also required more Ativan to control anxiety and behavior.   She is taking 0.5 mg QID instead of TID. Her daughter added a nighttime dose to help.   She is also asking about starting Zoloft for her mother. She will discuss this with Dr. Bains at the follow up visit.     The following portions of the patient's history were reviewed and updated as appropriate: allergies, current medications, past family history, past medical history, past social history, past surgical history and problem list.    Review of Systems   Constitutional: Positive for fever. Negative for chills.   Respiratory: Negative for cough and shortness of breath.    Cardiovascular: Negative for chest pain.   Gastrointestinal: Negative for abdominal pain.   Genitourinary: Positive for urinary incontinence and urgency. Negative for dysuria, frequency and pelvic pain.        Dark urine     Hematological: Does not bruise/bleed easily.   Psychiatric/Behavioral: Positive for behavioral problems. Negative for sleep disturbance.       Objective   Physical Exam   Constitutional: She is oriented to person, place, and time. She appears well-developed and well-nourished.   HENT:   Head: Normocephalic and atraumatic.   Right Ear: External ear normal.   Left Ear: External ear normal.   Nose: Nose normal.   Eyes: Conjunctivae are normal.   Neck: Normal range of motion.    Cardiovascular: Normal rate, regular rhythm and normal heart sounds.    No murmur heard.  Pulmonary/Chest: Effort normal and breath sounds normal. She has no wheezes.   Abdominal: Soft. Bowel sounds are normal. There is no tenderness.   Musculoskeletal: She exhibits no edema.   Ambulates with walker   Neurological: She is alert and oriented to person, place, and time.   Skin: Skin is warm and dry.   Psychiatric: Cognition and memory are impaired.   Nursing note and vitals reviewed.        Assessment/Plan   Anabel was seen today for altered mental status and urinary incontinence.    Diagnoses and all orders for this visit:    Acute cystitis without hematuria  -     POC Urinalysis Dipstick  -     Urine Culture - Urine, Urine, Clean Catch  -     ciprofloxacin (CIPRO) 250 MG tablet; Take 1 tablet by mouth Every 12 (Twelve) Hours.    Urinary incontinence, unspecified type      Urine cultured, will treat with Cipro. Acute cystitis explains low grade fever and recent worsening of dementia/behaviour.   Ok to continue Ativan 0.5 mg QID at this time since it is helping night time behavior. Consider adding Seroquel. Daughter will discuss other treatment options with Dr. Bains at patient's routine follow up.

## 2018-07-11 ENCOUNTER — TELEPHONE (OUTPATIENT)
Dept: FAMILY MEDICINE CLINIC | Facility: CLINIC | Age: 83
End: 2018-07-11

## 2018-07-11 LAB
BACTERIA UR CULT: NORMAL
BACTERIA UR CULT: NORMAL

## 2018-07-11 NOTE — TELEPHONE ENCOUNTER
SPOKE WITH PT DAUGHTER AND SHE IS CONCERNED ABOUT PT FALLING AS PT IS ALWAYS UP AND ON THE MOVE AND REFUSES TO USE WALKER. THEY HAVE HELP AND PT REFUSES TO USE WALKER FOR THAT PERSON AT ALL AND PT WILL NOT SIT DOWN AT ALL. PT IS ON ATIVAN AND IT HELPS SOME BUT DAUGHTER IS ASKING IF THERE IS SOMETHING A BIT STRONGER TO SEDATE HER A BIT MORE TO HAVE HER SIT AND BE MORE COMFORTABLE AND NOT UP ON MOVE ALL THE TIME. PLEASE ADVISE.

## 2018-07-16 ENCOUNTER — TELEPHONE (OUTPATIENT)
Dept: FAMILY MEDICINE CLINIC | Facility: CLINIC | Age: 83
End: 2018-07-16

## 2018-07-16 DIAGNOSIS — F02.80 LATE ONSET ALZHEIMER'S DISEASE WITHOUT BEHAVIORAL DISTURBANCE (HCC): ICD-10-CM

## 2018-07-16 DIAGNOSIS — R53.81 DEBILITY: Primary | ICD-10-CM

## 2018-07-16 DIAGNOSIS — F41.9 ANXIETY: ICD-10-CM

## 2018-07-16 DIAGNOSIS — G30.1 LATE ONSET ALZHEIMER'S DISEASE WITHOUT BEHAVIORAL DISTURBANCE (HCC): ICD-10-CM

## 2018-07-16 RX ORDER — LORAZEPAM 0.5 MG/1
.5-1 TABLET ORAL EVERY 8 HOURS PRN
Qty: 120 TABLET | Refills: 2 | Status: SHIPPED | OUTPATIENT
Start: 2018-07-16

## 2018-07-16 NOTE — TELEPHONE ENCOUNTER
----- Message from Smita Welch sent at 7/16/2018  9:22 AM EDT -----  Contact: tai, daughter Destinee  Refill Ativan .5mg, .5mg mg in am, 1mg at lunch and .5mg at night, we changed the dosage from 1mg-2mg a day earlier this month and that is helping, [facility], [phone] may leave a message

## 2018-07-16 NOTE — TELEPHONE ENCOUNTER
----- Message from Smita Welch sent at 7/16/2018  9:24 AM EDT -----  Contact: lujan, daughter Destinee  Pt daughter would like a prescription for a belt that will assist her with lifting the patient, [phone] may leave a message

## 2018-07-16 NOTE — TELEPHONE ENCOUNTER
SPOKE WITH NATALIYA AND INFORMED HER OF THIS AND NATALIYA WANTS TO FAX TO [employer] FOR BELT.

## 2018-08-01 ENCOUNTER — TELEPHONE (OUTPATIENT)
Dept: FAMILY MEDICINE CLINIC | Facility: CLINIC | Age: 83
End: 2018-08-01

## 2018-08-01 NOTE — TELEPHONE ENCOUNTER
----- Message from Kamilla Vo sent at 8/1/2018 11:28 AM EDT -----  Contact: MARIBELL  DAUGHTER JESÚS NEEDS TO SPEAK WITH YOU RE:    HER MOTHER'S MEDICATION    [phone]

## 2018-08-01 NOTE — TELEPHONE ENCOUNTER
THE INCREASE ISN'T WORKING ON PT AND PT IS HAVING MORE DIFFICULT TIME BETWEEN 2-5 PM AND PT IS CAUSING HER SON TO GET MORE AGITATED ALSO. DAUGHTER IS WANTING SOMETHING DIFFERENT FOR PT PLEASE ADVISE. [place]TON

## 2018-08-02 RX ORDER — CITALOPRAM 10 MG/1
10 TABLET ORAL DAILY
Qty: 30 TABLET | Refills: 2 | Status: SHIPPED | OUTPATIENT
Start: 2018-08-02 | End: 2018-09-06 | Stop reason: SDUPTHER

## 2018-08-02 NOTE — TELEPHONE ENCOUNTER
Patient's daughter would like to do the citalopram. She wanted to know if she should start Anabel back on the Ativan or keep her off of it while taking this new medication?

## 2018-08-02 NOTE — TELEPHONE ENCOUNTER
Please call daughter.  I would like to start her on citalopram 10 mg once per day.  This is an antianxiety and antidepressant medication but has been shown to decrease agitation and patients with memory problems.  Please let me know if she would like to try and I can send in.

## 2018-08-02 NOTE — TELEPHONE ENCOUNTER
I sent in and she should continue th ativan so as not to cause withdrawal but can starting cutting back some.

## 2018-08-02 NOTE — TELEPHONE ENCOUNTER
TAKING 1 IN MORNING 2 IN LUNCH AND 1 IN EVENING.  SHE IS HAVING A ROUGH TIME BETWEEN 2-5 PM. PLEASE ADVISE.

## 2018-09-06 ENCOUNTER — OFFICE VISIT (OUTPATIENT)
Dept: FAMILY MEDICINE CLINIC | Facility: CLINIC | Age: 83
End: 2018-09-06

## 2018-09-06 VITALS
WEIGHT: 111 LBS | SYSTOLIC BLOOD PRESSURE: 120 MMHG | RESPIRATION RATE: 12 BRPM | BODY MASS INDEX: 21.79 KG/M2 | OXYGEN SATURATION: 98 % | DIASTOLIC BLOOD PRESSURE: 76 MMHG | HEIGHT: 60 IN | HEART RATE: 61 BPM | TEMPERATURE: 98.2 F

## 2018-09-06 DIAGNOSIS — E03.9 ACQUIRED HYPOTHYROIDISM: ICD-10-CM

## 2018-09-06 DIAGNOSIS — G30.8 ALZHEIMER'S DISEASE OF OTHER ONSET WITHOUT BEHAVIORAL DISTURBANCE: ICD-10-CM

## 2018-09-06 DIAGNOSIS — F02.80 ALZHEIMER'S DISEASE OF OTHER ONSET WITHOUT BEHAVIORAL DISTURBANCE: ICD-10-CM

## 2018-09-06 DIAGNOSIS — L98.9 SKIN LESION OF LEFT LEG: Primary | ICD-10-CM

## 2018-09-06 DIAGNOSIS — F39 MOOD DISORDER (HCC): ICD-10-CM

## 2018-09-06 LAB
ALBUMIN SERPL-MCNC: 4.22 G/DL (ref 3.2–4.8)
ALBUMIN/GLOB SERPL: 2 G/DL (ref 1.5–2.5)
ALP SERPL-CCNC: 70 U/L (ref 25–100)
ALT SERPL-CCNC: 13 U/L (ref 7–40)
AST SERPL-CCNC: 24 U/L (ref 0–33)
BASOPHILS # BLD AUTO: 0.07 10*3/MM3 (ref 0–0.2)
BASOPHILS NFR BLD AUTO: 1.5 % (ref 0–1)
BILIRUB SERPL-MCNC: 0.8 MG/DL (ref 0.3–1.2)
BUN SERPL-MCNC: 22 MG/DL (ref 9–23)
BUN/CREAT SERPL: 22.4 (ref 7–25)
CALCIUM SERPL-MCNC: 9 MG/DL (ref 8.7–10.4)
CHLORIDE SERPL-SCNC: 111 MMOL/L (ref 99–109)
CO2 SERPL-SCNC: 28 MMOL/L (ref 20–31)
CREAT SERPL-MCNC: 0.98 MG/DL (ref 0.6–1.3)
EOSINOPHIL # BLD AUTO: 0.19 10*3/MM3 (ref 0–0.3)
EOSINOPHIL NFR BLD AUTO: 4.1 % (ref 0–3)
ERYTHROCYTE [DISTWIDTH] IN BLOOD BY AUTOMATED COUNT: 15.6 % (ref 11.3–14.5)
GLOBULIN SER CALC-MCNC: 2.1 GM/DL
GLUCOSE SERPL-MCNC: 94 MG/DL (ref 70–100)
HCT VFR BLD AUTO: 40.3 % (ref 34.5–44)
HGB BLD-MCNC: 12.8 G/DL (ref 11.5–15.5)
IMM GRANULOCYTES # BLD: 0 10*3/MM3 (ref 0–0.03)
IMM GRANULOCYTES NFR BLD: 0 % (ref 0–0.6)
LYMPHOCYTES # BLD AUTO: 1.59 10*3/MM3 (ref 0.6–4.8)
LYMPHOCYTES NFR BLD AUTO: 34.6 % (ref 24–44)
MCH RBC QN AUTO: 32.2 PG (ref 27–31)
MCHC RBC AUTO-ENTMCNC: 31.8 G/DL (ref 32–36)
MCV RBC AUTO: 101.5 FL (ref 80–99)
MONOCYTES # BLD AUTO: 0.45 10*3/MM3 (ref 0–1)
MONOCYTES NFR BLD AUTO: 9.8 % (ref 0–12)
NEUTROPHILS # BLD AUTO: 2.29 10*3/MM3 (ref 1.5–8.3)
NEUTROPHILS NFR BLD AUTO: 50 % (ref 41–71)
PLATELET # BLD AUTO: 120 10*3/MM3 (ref 150–450)
POTASSIUM SERPL-SCNC: 4.3 MMOL/L (ref 3.5–5.5)
PROT SERPL-MCNC: 6.3 G/DL (ref 5.7–8.2)
RBC # BLD AUTO: 3.97 10*6/MM3 (ref 3.89–5.14)
SODIUM SERPL-SCNC: 137 MMOL/L (ref 132–146)
TSH SERPL DL<=0.005 MIU/L-ACNC: 2.51 MIU/ML (ref 0.35–5.35)
WBC # BLD AUTO: 4.59 10*3/MM3 (ref 3.5–10.8)

## 2018-09-06 PROCEDURE — 99214 OFFICE O/P EST MOD 30 MIN: CPT | Performed by: FAMILY MEDICINE

## 2018-09-06 RX ORDER — CITALOPRAM 20 MG/1
20 TABLET ORAL DAILY
Qty: 30 TABLET | Refills: 5 | Status: SHIPPED | OUTPATIENT
Start: 2018-09-06

## 2018-09-06 NOTE — PROGRESS NOTES
Assessment/Plan       Problems Addressed this Visit        Endocrine    Hypothyroidism    Relevant Orders    CBC & Differential    Comprehensive Metabolic Panel    TSH       Nervous and Auditory    Dementia without behavioral disturbance    Relevant Medications    citalopram (CeleXA) 20 MG tablet    Other Relevant Orders    CBC & Differential    Comprehensive Metabolic Panel       Other    Mood disorder (CMS/HCC)    Relevant Medications    citalopram (CeleXA) 20 MG tablet      Other Visit Diagnoses     Skin lesion of left leg    -  Primary    With rapid growth, rec see derm to have removed. may be early cancer    Relevant Orders    Ambulatory Referral to Dermatology            Follow up: Return for follow up depends on review of labs and testing.     DISCUSSION  Skin lesion of left leg.  Recommend refer to dermatology given rapid growth.  Daughter agrees and will make appointment.    Alzheimer's dementia.  Stable.  Continue medication.    Mood disorder.  Will increase Celexa to 20 mg daily.  She has improved with the 10 mg but still having some issues.  Doing much better.    Hypothyroidism.  Check labs as noted.    They are going to be moving to [place] soon.  Daughter will call us in the meantime there are any issues before she has finds a primary doctor there.      MEDICATIONS PRESCRIBED  Requested Prescriptions     Signed Prescriptions Disp Refills   • citalopram (CeleXA) 20 MG tablet 30 tablet 5     Sig: Take 1 tablet by mouth Daily.              -------------------------------------------    Subjective     Chief Complaint   Patient presents with   • Alzheimer's Disease     4 month follow up   • Hyperlipidemia   • Anxiety   • Hypothyroidism         Alzheimer's Disease   This is a chronic problem. The current episode started more than 1 year ago. The problem occurs constantly. The problem has been unchanged (better with celexa and not as nervous). Associated symptoms include congestion (little this am).  "Pertinent negatives include no anorexia, change in bowel habit, coughing, fatigue, nausea or vomiting. Nothing aggravates the symptoms. Treatments tried: no meds for this now.    Hyperlipidemia   This is a chronic problem. The problem is controlled. Exacerbating diseases include hypothyroidism. She is currently on no antihyperlipidemic treatment (hospice had been coming and stopped in May). There are no compliance problems.    Anxiety   Presents for follow-up visit. Symptoms include irritability and nervous/anxious behavior. Patient reports no insomnia or nausea. The quality of sleep is good.       Hypothyroidism   This is a chronic problem. The current episode started more than 1 year ago. The problem occurs daily. The problem has been unchanged. Associated symptoms include congestion (little this am). Pertinent negatives include no anorexia, change in bowel habit, coughing, fatigue, nausea or vomiting. Nothing aggravates the symptoms. Treatments tried: levothyroxine. The treatment provided moderate relief.               History   Smoking Status   • Former Smoker   Smokeless Tobacco   • Never Used     Comment: stopped smoking many years ago        Past Medical History,Medications, Allergies, and social history was reviewed.      Review of Systems   Constitutional: Positive for irritability. Negative for fatigue.   HENT: Positive for congestion (little this am).    Respiratory: Negative for cough.    Gastrointestinal: Negative for anorexia, change in bowel habit, nausea and vomiting.   Psychiatric/Behavioral: The patient is nervous/anxious. The patient does not have insomnia.        Objective     Vitals:    09/06/18 1035   BP: 120/76   Pulse: 61   Resp: 12   Temp: 98.2 °F (36.8 °C)   TempSrc: Temporal Artery    SpO2: 98%   Weight: 50.3 kg (111 lb)   Height: 152.4 cm (60\")          Physical Exam   Constitutional: She appears well-developed and well-nourished.   HENT:   Head: Normocephalic and atraumatic.   Right Ear: " Hearing and external ear normal.   Left Ear: Hearing and external ear normal.   Mouth/Throat: Oropharynx is clear and moist.   Eyes: Pupils are equal, round, and reactive to light. Conjunctivae and EOM are normal.   Cardiovascular: Normal rate, regular rhythm and normal heart sounds.  Exam reveals no friction rub.    No murmur heard.  Pulmonary/Chest: Effort normal and breath sounds normal. No respiratory distress. She has no wheezes. She has no rales.   Abdominal: Soft. Bowel sounds are normal. She exhibits no distension. There is no tenderness. There is no guarding.   Lymphadenopathy:     She has no cervical adenopathy.   Neurological: She is alert.   Confused   Skin: Skin is warm.   Psychiatric: She has a normal mood and affect. Her behavior is normal.   Nursing note and vitals reviewed.                Aldo Bains MD

## 2018-09-19 ENCOUNTER — OFFICE VISIT (OUTPATIENT)
Dept: FAMILY MEDICINE CLINIC | Facility: CLINIC | Age: 83
End: 2018-09-19

## 2018-09-19 VITALS
RESPIRATION RATE: 18 BRPM | HEIGHT: 60 IN | TEMPERATURE: 98.8 F | HEART RATE: 68 BPM | SYSTOLIC BLOOD PRESSURE: 128 MMHG | DIASTOLIC BLOOD PRESSURE: 70 MMHG

## 2018-09-19 DIAGNOSIS — R40.4 TRANSIENT ALTERATION OF AWARENESS: Primary | ICD-10-CM

## 2018-09-19 PROCEDURE — 99213 OFFICE O/P EST LOW 20 MIN: CPT | Performed by: FAMILY MEDICINE

## 2018-09-19 PROCEDURE — 81003 URINALYSIS AUTO W/O SCOPE: CPT | Performed by: FAMILY MEDICINE

## 2018-09-19 RX ORDER — LEVOTHYROXINE SODIUM 0.05 MG/1
TABLET ORAL
Qty: 90 TABLET | Refills: 1 | Status: SHIPPED | OUTPATIENT
Start: 2018-09-19

## 2018-09-19 NOTE — PROGRESS NOTES
Subjective   Anabel Fay is a 87 y.o. female.     History of Present Illness     Pt has severe dementia    Pt was with her daughter this AM and was fine all day yesterday and this AM  Pt has been living with a caregiver today and suddenly came on where she had poor balance and had to hold on to prevent a fall  She was lethargic and pale, urine was dark  Had incontinence but she has had this on occasion in the past    Most of the time at home she walks independently but sometimes uses a walker  She is in wheelchair here      Review of Systems   Constitutional: Positive for fatigue.   Musculoskeletal: Positive for gait problem.       Objective   Physical Exam   Constitutional: She appears well-developed and well-nourished. No distress.   She is sitting in wheel chair, makes eye contact and conversant   Cardiovascular: Normal rate, regular rhythm and normal heart sounds.    Pulmonary/Chest: Effort normal and breath sounds normal.   Neurological: She is alert.   Psychiatric: She has a normal mood and affect. Her behavior is normal.   Nursing note and vitals reviewed.      Assessment/Plan   Anabel was seen today for gait problem and fatigue.    Diagnoses and all orders for this visit:    Transient alteration of awareness  -     Urine Culture - , Urine, Clean Catch    per daughter she is back to baseline at this time, transient paleness and fatigue.  Will check for urine culture and family will stephanie back if this returns.

## 2018-09-20 LAB
BILIRUB BLD-MCNC: NEGATIVE MG/DL
GLUCOSE UR STRIP-MCNC: NEGATIVE MG/DL
KETONES UR QL: NEGATIVE
LEUKOCYTE EST, POC: NEGATIVE
NITRITE UR-MCNC: NEGATIVE MG/ML
PH UR: 6 [PH] (ref 5–8)
PROT UR STRIP-MCNC: ABNORMAL MG/DL
RBC # UR STRIP: ABNORMAL /UL
SP GR UR: 1.02 (ref 1–1.03)
UROBILINOGEN UR QL: NORMAL

## 2018-09-22 LAB
BACTERIA UR CULT: NORMAL
BACTERIA UR CULT: NORMAL

## 2018-10-09 ENCOUNTER — TELEPHONE (OUTPATIENT)
Dept: FAMILY MEDICINE CLINIC | Facility: CLINIC | Age: 83
End: 2018-10-09

## 2018-10-10 NOTE — TELEPHONE ENCOUNTER
SPOKE WITH NATALIYA AND PT DOESN'T NEED ANY REFILLS. THEY HAVE APPT FOR NOV FOR A NEW PHSYCHIAN  FOR SON AND THEN WILL GET PT IN .

## 2023-08-10 NOTE — MR AVS SNAPSHOT
Anabel Fay   1/3/2017 3:15 PM   Office Visit    Dept Phone:  [phone]   Encounter #:  [id number]    Provider:  Amrit Cooney MD   Department:  [facility]                Your Full Care Plan              Today's Medication Changes          These changes are accurate as of: 1/3/17 11:59 PM.  If you have any questions, ask your nurse or doctor.               New Medication(s)Ordered:     HYDROcodone-acetaminophen 5-325 MG per tablet   Commonly known as:  NORCO   Take 1 tablet by mouth Every 6 (Six) Hours As Needed for moderate pain (4-6) or severe pain (7-10).   Started by:  Amrit Cooney MD       tiZANidine 4 MG tablet   Commonly known as:  ZANAFLEX   1/2-1 po qhs PRN   Started by:  Amrit Cooney MD            Where to Get Your Medications      These medications were sent to [address] [phone] [place] [phone] [address] [address]     Phone:  [phone]     tiZANidine 4 MG tablet         You can get these medications from any pharmacy     Bring a paper prescription for each of these medications     HYDROcodone-acetaminophen 5-325 MG per tablet                  Your Updated Medication List          This list is accurate as of: 1/3/17 11:59 PM.  Always use your most recent med list.                atorvastatin 20 MG tablet   Commonly known as:  LIPITOR   TAKE ONE TABLET BY MOUTH DAILY       ciprofloxacin 500 MG tablet   Commonly known as:  CIPRO   Take 1 tablet by mouth 2 (Two) Times a Day.       HYDROcodone-acetaminophen 5-325 MG per tablet   Commonly known as:  NORCO   Take 1 tablet by mouth Every 6 (Six) Hours As Needed for moderate pain (4-6) or severe pain (7-10).       levothyroxine 75 MCG tablet   Commonly known as:  SYNTHROID, LEVOTHROID   TAKE ONE TABLET BY MOUTH DAILY       NAMENDA XR 28 MG capsule sustained-release 24 hr extended  release capsule   Generic drug:  memantine       raloxifene 60 MG tablet   Commonly known as:  EVISTA   TAKE ONE TABLET BY MOUTH DAILY AS DIRECTED       tiZANidine 4 MG tablet   Commonly known as:  ZANAFLEX   1/2-1 po qhs PRN       traMADol 50 MG tablet   Commonly known as:  ULTRAM   Take 1 tablet by mouth Every 6 (Six) Hours As Needed for moderate pain (4-6).               You Were Diagnosed With        Codes Comments    Acute midline low back pain without sciatica    -  Primary ICD-10-CM: M54.5  ICD-9-CM: 724.2       Instructions     None    Patient Instructions History      Upcoming Appointments       [employer] Signup     [facility] [employer] allows you to send messages to your doctor, view your test results, renew your prescriptions, schedule appointments, and more. To sign up, go to [employer] and click on the Sign Up Now link in the New User? box. Enter your [employer] Activation Code exactly as it appears below along with the last four digits of your Social Security Number and your Date of Birth () to complete the sign-up process. If you do not sign up before the expiration date, you must request a new code.    [employer] Activation Code: [email or web address]  Expires: 3/8/2017 10:24 AM    If you have questions, you can email [email or web address] or call [phone] to talk to our [employer] staff. Remember, [employer] is NOT to be used for urgent needs. For medical emergencies, dial 911.               Other Info from Your Visit           Allergies     Sulfa Antibiotics        Reason for Visit     Follow-up           Vital Signs     Blood Pressure Pulse Temperature Respirations Smoking Status       130/60 84 98.2 °F (36.8 °C) 18 Former Smoker       Problems and Diagnoses Noted     Acute midline low back pain without sciatica         Patient was scheduled for outpatient IVIG infusion on 8/3. Gammagard 70mg IV ordered, but patient reports he must take gammagard SD with benadryl pretreatment. Pharmacy has ordered this product to be given inpatient.  - pending 1x gammagard SD 70mg infusion with benadryl 50mg PO + tylenol 30min prior to treatment